# Patient Record
Sex: MALE | Race: WHITE | NOT HISPANIC OR LATINO | URBAN - NONMETROPOLITAN AREA
[De-identification: names, ages, dates, MRNs, and addresses within clinical notes are randomized per-mention and may not be internally consistent; named-entity substitution may affect disease eponyms.]

---

## 2020-02-03 ENCOUNTER — OFFICE VISIT (OUTPATIENT)
Dept: FAMILY MEDICINE CLINIC | Facility: CLINIC | Age: 25
End: 2020-02-03

## 2020-02-03 VITALS
OXYGEN SATURATION: 96 % | BODY MASS INDEX: 38.8 KG/M2 | DIASTOLIC BLOOD PRESSURE: 79 MMHG | HEART RATE: 98 BPM | WEIGHT: 256 LBS | TEMPERATURE: 100.8 F | HEIGHT: 68 IN | SYSTOLIC BLOOD PRESSURE: 125 MMHG

## 2020-02-03 DIAGNOSIS — Z02.1 PRE-EMPLOYMENT EXAMINATION: ICD-10-CM

## 2020-02-03 DIAGNOSIS — Z02.1 PRE-EMPLOYMENT DRUG SCREENING: Primary | ICD-10-CM

## 2020-02-03 LAB
BILIRUB BLD-MCNC: ABNORMAL MG/DL
CLARITY, POC: CLEAR
COLOR UR: ABNORMAL
GLUCOSE UR STRIP-MCNC: NEGATIVE MG/DL
KETONES UR QL: ABNORMAL
LEUKOCYTE EST, POC: NEGATIVE
NITRITE UR-MCNC: NEGATIVE MG/ML
PH UR: 5.5 [PH] (ref 5–8)
POC AMPHETAMINES: NEGATIVE
POC BARBITURATES: NEGATIVE
POC BENZODIAZEPHINES: NEGATIVE
POC COCAINE: NEGATIVE
POC METHADONE: NEGATIVE
POC METHAMPHETAMINE SCREEN URINE: NEGATIVE
POC OPIATES: NEGATIVE
POC OXYCODONE: NEGATIVE
POC PHENCYCLIDINE: NEGATIVE
POC PROPOXYPHENE: NEGATIVE
POC THC: NEGATIVE
POC TRICYCLIC ANTIDEPRESSANTS: NEGATIVE
PROT UR STRIP-MCNC: NEGATIVE MG/DL
RBC # UR STRIP: NEGATIVE /UL
SP GR UR: 1.03 (ref 1–1.03)
UROBILINOGEN UR QL: NORMAL

## 2020-02-03 PROCEDURE — 81003 URINALYSIS AUTO W/O SCOPE: CPT | Performed by: NURSE PRACTITIONER

## 2020-02-03 PROCEDURE — 80305 DRUG TEST PRSMV DIR OPT OBS: CPT | Performed by: NURSE PRACTITIONER

## 2020-02-03 PROCEDURE — PEPHY: Performed by: NURSE PRACTITIONER

## 2020-02-03 RX ORDER — IBUPROFEN 200 MG
TABLET ORAL
COMMUNITY
Start: 2013-03-13

## 2022-09-23 ENCOUNTER — HOSPITAL ENCOUNTER (EMERGENCY)
Facility: HOSPITAL | Age: 27
Discharge: HOME | End: 2022-09-23
Attending: EMERGENCY MEDICINE | Admitting: EMERGENCY MEDICINE

## 2022-09-23 ENCOUNTER — APPOINTMENT (OUTPATIENT)
Dept: CT IMAGING | Facility: HOSPITAL | Age: 27
End: 2022-09-23

## 2022-09-23 VITALS
TEMPERATURE: 98.2 F | OXYGEN SATURATION: 98 % | HEART RATE: 76 BPM | BODY MASS INDEX: 41.97 KG/M2 | RESPIRATION RATE: 18 BRPM | SYSTOLIC BLOOD PRESSURE: 130 MMHG | DIASTOLIC BLOOD PRESSURE: 82 MMHG | WEIGHT: 276.9 LBS | HEIGHT: 68 IN

## 2022-09-23 DIAGNOSIS — R10.9 FLANK PAIN: Primary | ICD-10-CM

## 2022-09-23 LAB
ALBUMIN SERPL-MCNC: 4.1 G/DL (ref 3.5–5.2)
ALBUMIN/GLOB SERPL: 1.8 G/DL
ALP SERPL-CCNC: 91 U/L (ref 39–117)
ALT SERPL W P-5'-P-CCNC: 24 U/L (ref 1–41)
ANION GAP SERPL CALCULATED.3IONS-SCNC: 7.1 MMOL/L (ref 5–15)
AST SERPL-CCNC: 20 U/L (ref 1–40)
BASOPHILS # BLD AUTO: 0.08 10*3/MM3 (ref 0–0.2)
BASOPHILS NFR BLD AUTO: 0.6 % (ref 0–1.5)
BILIRUB SERPL-MCNC: 0.3 MG/DL (ref 0–1.2)
BILIRUB UR QL STRIP: NEGATIVE
BUN SERPL-MCNC: 13 MG/DL (ref 6–20)
BUN/CREAT SERPL: 14.1 (ref 7–25)
CALCIUM SPEC-SCNC: 9.4 MG/DL (ref 8.6–10.5)
CHLORIDE SERPL-SCNC: 105 MMOL/L (ref 98–107)
CLARITY UR: CLEAR
CO2 SERPL-SCNC: 26.9 MMOL/L (ref 22–29)
COLOR UR: YELLOW
CREAT SERPL-MCNC: 0.92 MG/DL (ref 0.76–1.27)
DEPRECATED RDW RBC AUTO: 37.6 FL (ref 37–54)
EGFRCR SERPLBLD CKD-EPI 2021: 118.4 ML/MIN/1.73
EOSINOPHIL # BLD AUTO: 0.48 10*3/MM3 (ref 0–0.4)
EOSINOPHIL NFR BLD AUTO: 3.6 % (ref 0.3–6.2)
ERYTHROCYTE [DISTWIDTH] IN BLOOD BY AUTOMATED COUNT: 11.7 % (ref 12.3–15.4)
GLOBULIN UR ELPH-MCNC: 2.3 GM/DL
GLUCOSE SERPL-MCNC: 94 MG/DL (ref 65–99)
GLUCOSE UR STRIP-MCNC: NEGATIVE MG/DL
HCT VFR BLD AUTO: 44.8 % (ref 37.5–51)
HGB BLD-MCNC: 16 G/DL (ref 13–17.7)
HGB UR QL STRIP.AUTO: NEGATIVE
IMM GRANULOCYTES # BLD AUTO: 0.06 10*3/MM3 (ref 0–0.05)
IMM GRANULOCYTES NFR BLD AUTO: 0.4 % (ref 0–0.5)
KETONES UR QL STRIP: NEGATIVE
LEUKOCYTE ESTERASE UR QL STRIP.AUTO: NEGATIVE
LYMPHOCYTES # BLD AUTO: 5.28 10*3/MM3 (ref 0.7–3.1)
LYMPHOCYTES NFR BLD AUTO: 39.2 % (ref 19.6–45.3)
MCH RBC QN AUTO: 31.3 PG (ref 26.6–33)
MCHC RBC AUTO-ENTMCNC: 35.7 G/DL (ref 31.5–35.7)
MCV RBC AUTO: 87.7 FL (ref 79–97)
MONOCYTES # BLD AUTO: 0.91 10*3/MM3 (ref 0.1–0.9)
MONOCYTES NFR BLD AUTO: 6.8 % (ref 5–12)
NEUTROPHILS NFR BLD AUTO: 49.4 % (ref 42.7–76)
NEUTROPHILS NFR BLD AUTO: 6.65 10*3/MM3 (ref 1.7–7)
NITRITE UR QL STRIP: NEGATIVE
NRBC BLD AUTO-RTO: 0 /100 WBC (ref 0–0.2)
PH UR STRIP.AUTO: 5.5 [PH] (ref 4.5–8)
PLATELET # BLD AUTO: 208 10*3/MM3 (ref 140–450)
PMV BLD AUTO: 10.6 FL (ref 6–12)
POTASSIUM SERPL-SCNC: 3.9 MMOL/L (ref 3.5–5.2)
PROT SERPL-MCNC: 6.4 G/DL (ref 6–8.5)
PROT UR QL STRIP: NEGATIVE
RBC # BLD AUTO: 5.11 10*6/MM3 (ref 4.14–5.8)
SODIUM SERPL-SCNC: 139 MMOL/L (ref 136–145)
SP GR UR STRIP: 1.02 (ref 1–1.03)
UROBILINOGEN UR QL STRIP: NORMAL
WBC NRBC COR # BLD: 13.46 10*3/MM3 (ref 3.4–10.8)

## 2022-09-23 PROCEDURE — 80053 COMPREHEN METABOLIC PANEL: CPT | Performed by: EMERGENCY MEDICINE

## 2022-09-23 PROCEDURE — 96374 THER/PROPH/DIAG INJ IV PUSH: CPT

## 2022-09-23 PROCEDURE — 74176 CT ABD & PELVIS W/O CONTRAST: CPT

## 2022-09-23 PROCEDURE — 25010000002 KETOROLAC TROMETHAMINE PER 15 MG

## 2022-09-23 PROCEDURE — 81003 URINALYSIS AUTO W/O SCOPE: CPT | Performed by: EMERGENCY MEDICINE

## 2022-09-23 PROCEDURE — 99283 EMERGENCY DEPT VISIT LOW MDM: CPT

## 2022-09-23 PROCEDURE — 85025 COMPLETE CBC W/AUTO DIFF WBC: CPT | Performed by: EMERGENCY MEDICINE

## 2022-09-23 RX ORDER — KETOROLAC TROMETHAMINE 30 MG/ML
15 INJECTION, SOLUTION INTRAMUSCULAR; INTRAVENOUS ONCE
Status: COMPLETED | OUTPATIENT
Start: 2022-09-23 | End: 2022-09-23

## 2022-09-23 RX ORDER — SODIUM CHLORIDE 0.9 % (FLUSH) 0.9 %
10 SYRINGE (ML) INJECTION AS NEEDED
Status: DISCONTINUED | OUTPATIENT
Start: 2022-09-23 | End: 2022-09-23 | Stop reason: HOSPADM

## 2022-09-23 RX ADMIN — KETOROLAC TROMETHAMINE 15 MG: 30 INJECTION, SOLUTION INTRAMUSCULAR; INTRAVENOUS at 20:48

## 2022-09-23 RX ADMIN — SODIUM CHLORIDE 1000 ML: 9 INJECTION, SOLUTION INTRAVENOUS at 20:48

## 2022-09-23 NOTE — ED PROVIDER NOTES
EMERGENCY DEPARTMENT ENCOUNTER      Room Number: 05/05    History is provided by the patient, no translation services needed    HPI:    Chief complaint: Flank pain    Location: Right flank    Quality/Severity: Patient describes the pain as a sharp pain.  He rates the severity of 5 out of 10.    Timing/Duration: Since this morning.    Modifying Factors: None    Associated Symptoms: Hematuria, right lower abdominal pain    Narrative: Pt is a 27 y.o. male who presents complaining of right flank pain since this morning.  Patient advises that he has a history of kidney stones.  He woke up this morning with back pain that he believed may have been muscular in nature.  Once the patient went to work the back pain continued on the right side.  The pain wraps around to his right lower abdomen, mimicking the pain from his previous kidney stones.  The patient left work to go to the hospital and was seen at [facility].  A UA was performed and the patient was diagnosed with sciatica.  Patient advises that no blood work or scans were performed.  He states that he went home and then noticed that he had red/brown urine.  He denies any nausea or vomiting.  Patient denies any fevers or chills.  He denies any dysuria or difficulty urinating.      PMD: Provider, No Known    REVIEW OF SYSTEMS  Review of Systems   Constitutional: Negative for chills and fever.   Eyes: Negative for visual disturbance.   Respiratory: Negative for cough and shortness of breath.    Cardiovascular: Negative for chest pain.   Gastrointestinal: Positive for abdominal pain (Right flank pain wrapping around to RLQ ). Negative for blood in stool, constipation, diarrhea, nausea and vomiting.   Genitourinary: Positive for flank pain (Right) and hematuria. Negative for difficulty urinating and dysuria.   Musculoskeletal: Negative for gait problem, myalgias and neck pain.   Skin: Negative for color change, pallor, rash and wound.   Neurological: Negative for  dizziness, syncope, weakness, numbness and headaches.   Psychiatric/Behavioral: Negative for confusion. The patient is not nervous/anxious.          PAST MEDICAL HISTORY  Active Ambulatory Problems     Diagnosis Date Noted   • No Active Ambulatory Problems     Resolved Ambulatory Problems     Diagnosis Date Noted   • No Resolved Ambulatory Problems     Past Medical History:   Diagnosis Date   • Bronchitis, acute    • Cough    • Fever, unspecified    • Fracture, finger, distal phalanx    • Gastroenteritis    • Migraines    • Seizure disorder (HCC)    • Sinusitis    • Testicular pain    • URI (upper respiratory infection)    • Viral infection        PAST SURGICAL HISTORY  Past Surgical History:   Procedure Laterality Date   • ANKLE SURGERY     • TONSILLECTOMY AND ADENOIDECTOMY         FAMILY HISTORY  Family History   Adopted: Yes       SOCIAL HISTORY  Social History     Socioeconomic History   • Marital status: Single   Tobacco Use   • Smoking status: Current Every Day Smoker     Packs/day: 1.00   • Smokeless tobacco: Never Used   Substance and Sexual Activity   • Alcohol use: Not Currently   • Drug use: Not Currently       ALLERGIES  Patient has no known allergies.      Current Facility-Administered Medications:   •  sodium chloride 0.9 % bolus 1,000 mL, 1,000 mL, Intravenous, Once, Fatimah Humphries PA-C, Last Rate: 2,000 mL/hr at 09/23/22 2048, 1,000 mL at 09/23/22 2048  •  [COMPLETED] Insert peripheral IV, , , Once **AND** sodium chloride 0.9 % flush 10 mL, 10 mL, Intravenous, PRN, Alfred Pulido MD    Current Outpatient Medications:   •  ibuprofen (ADVIL,MOTRIN) 200 MG tablet, ADVIL 200 MG TABS, Disp: , Rfl:     PHYSICAL EXAM  ED Triage Vitals [09/23/22 1913]   Temp Heart Rate Resp BP SpO2   97.9 °F (36.6 °C) 88 24 140/99 96 %      Temp src Heart Rate Source Patient Position BP Location FiO2 (%)   Oral Monitor Sitting Right arm --       Physical Exam  Vitals and nursing note reviewed.   Constitutional:        General: He is not in acute distress.     Appearance: Normal appearance. He is not ill-appearing, toxic-appearing or diaphoretic.   HENT:      Head: Normocephalic and atraumatic.   Eyes:      Extraocular Movements: Extraocular movements intact.      Conjunctiva/sclera: Conjunctivae normal.      Pupils: Pupils are equal, round, and reactive to light.   Cardiovascular:      Rate and Rhythm: Normal rate and regular rhythm.      Heart sounds: Normal heart sounds.   Pulmonary:      Effort: Pulmonary effort is normal. No respiratory distress.      Breath sounds: Normal breath sounds. No stridor. No wheezing, rhonchi or rales.   Chest:      Chest wall: No tenderness.   Abdominal:      General: Bowel sounds are normal. There is no distension.      Palpations: Abdomen is soft. There is no mass.      Tenderness: There is no abdominal tenderness. There is right CVA tenderness. There is no left CVA tenderness, guarding or rebound.   Musculoskeletal:         General: No swelling, tenderness, deformity or signs of injury. Normal range of motion.      Cervical back: Normal range of motion and neck supple.      Right lower leg: No edema.      Left lower leg: No edema.   Skin:     General: Skin is warm and dry.      Coloration: Skin is not jaundiced or pale.      Findings: No bruising, erythema, lesion or rash.   Neurological:      Mental Status: He is alert and oriented to person, place, and time.   Psychiatric:         Mood and Affect: Mood and affect normal.           LAB RESULTS  Lab Results (last 24 hours)     Procedure Component Value Units Date/Time    Urinalysis With Microscopic If Indicated (No Culture) - Urine, Clean Catch [[id number]]  (Normal) Collected: 09/23/22 1917    Specimen: Urine, Clean Catch Updated: 09/23/22 1927     Color, UA Yellow     Appearance, UA Clear     pH, UA 5.5     Specific Gravity, UA 1.023     Comment: Result obtained by Refractometer        Glucose, UA Negative     Ketones, UA Negative      Bilirubin, UA Negative     Blood, UA Negative     Protein, UA Negative     Leuk Esterase, UA Negative     Nitrite, UA Negative     Urobilinogen, UA 0.2 E.U./dL    Narrative:      Urine microscopic not indicated.    CBC & Differential [[id number]]  (Abnormal) Collected: 09/23/22 1923    Specimen: Blood Updated: 09/23/22 1939    Narrative:      The following orders were created for panel order CBC & Differential.  Procedure                               Abnormality         Status                     ---------                               -----------         ------                     CBC Auto Differential[[id number]]        Abnormal            Final result                 Please view results for these tests on the individual orders.    Comprehensive Metabolic Panel [[id number]] Collected: 09/23/22 1923    Specimen: Blood Updated: 09/23/22 1955     Glucose 94 mg/dL      BUN 13 mg/dL      Creatinine 0.92 mg/dL      Sodium 139 mmol/L      Potassium 3.9 mmol/L      Chloride 105 mmol/L      CO2 26.9 mmol/L      Calcium 9.4 mg/dL      Total Protein 6.4 g/dL      Albumin 4.10 g/dL      ALT (SGPT) 24 U/L      AST (SGOT) 20 U/L      Alkaline Phosphatase 91 U/L      Total Bilirubin 0.3 mg/dL      Globulin 2.3 gm/dL      A/G Ratio 1.8 g/dL      BUN/Creatinine Ratio 14.1     Anion Gap 7.1 mmol/L      eGFR 118.4 mL/min/1.73      Comment: National Kidney Foundation and American Society of Nephrology (ASN) Task Force recommended calculation based on the Chronic Kidney Disease Epidemiology Collaboration (CKD-EPI) equation refit without adjustment for race.       Narrative:      GFR Normal >60  Chronic Kidney Disease <60  Kidney Failure <15      CBC Auto Differential [[id number]]  (Abnormal) Collected: 09/23/22 1923    Specimen: Blood Updated: 09/23/22 1939     WBC 13.46 10*3/mm3      RBC 5.11 10*6/mm3      Hemoglobin 16.0 g/dL      Hematocrit 44.8 %      MCV 87.7 fL      MCH 31.3 pg      MCHC 35.7 g/dL      RDW 11.7 %      RDW-SD 37.6  fl      MPV 10.6 fL      Platelets 208 10*3/mm3      Neutrophil % 49.4 %      Lymphocyte % 39.2 %      Monocyte % 6.8 %      Eosinophil % 3.6 %      Basophil % 0.6 %      Immature Grans % 0.4 %      Neutrophils, Absolute 6.65 10*3/mm3      Lymphocytes, Absolute 5.28 10*3/mm3      Monocytes, Absolute 0.91 10*3/mm3      Eosinophils, Absolute 0.48 10*3/mm3      Basophils, Absolute 0.08 10*3/mm3      Immature Grans, Absolute 0.06 10*3/mm3      nRBC 0.0 /100 WBC             I ordered the above labs and reviewed the results    RADIOLOGY  CT Abdomen Pelvis Without Contrast    Result Date: 9/23/2022  CT Abdomen Pelvis WO INDICATION: Right-sided pain starting around 2:00 AM today,  hematuria with history of kidney stones TECHNIQUE: CT of the abdomen and pelvis without IV contrast. Coronal and sagittal reconstructions were obtained.  Radiation dose reduction techniques included automated exposure control or exposure modulation based on body size. Radiation audit for CT and nuclear cardiology exams in the last 12 months: 0. COMPARISON: 10/11/2019 FINDINGS: The visualized lung bases are clear. No destructive osseous lesion. Evaluation of the solid viscera is compromised by lack of IV contrast. Allowing for this: Normal noncontrast appearance of the liver, gallbladder, pancreas, spleen and both adrenal glands. The kidneys are symmetric in size. Evaluation for solid renal lesion is largely precluded by lack of IV contrast. . There are a few renal calculi measuring less than 2 mm on both sides but there is no ureteral calculus and there is no hydronephrosis. Nonspecific urinary bladder wall thickening. Correlate with urinalysis. No evidence of bowel obstruction. No localizing perienteric inflammatory change. Normal appendix. Normal caliber of the abdominal aorta. No lymphadenopathy within the abdomen or pelvis by size criteria.     1.  There are a few renal calculi measuring less than 2 mm on both sides but there is no ureteral  "calculus and there is no hydronephrosis. 2.  Nonspecific urinary bladder wall thickening. Correlate with urinalysis. Signer Name: BALTA WARREN MD  Signed: 9/23/2022 8:13 PM  Workstation Name: [email or web address]  [facility]      I ordered the above radiologic testing and reviewed the results    PROCEDURES  Procedures      PROGRESS AND CONSULTS  ED Course as of 09/23/22 2116   Fri Sep 23, 2022   1954 Turning the pt over to Dr. Pulido at this time.  []   2116 I reviewed the labs and the CT scan from today's visit.  All test results look to be pretty reassuring to me here.  I do not see any ureteral stone and his urinalysis looks pretty reassuring.  No need for antibiotics or further intervention at this time.  We will advised rest and plenty of fluids orally.  Encourage typical over-the-counter medications at home for pain as need be.  Reviewed with him the usual \"return to ER\" instructions for any worsening signs or symptoms as well. [MALOU]      ED Course User Index  [AH] Fatimah Humphries PA-C  [MALOU] Alfred Pulido MD           MEDICAL DECISION MAKING    MDM     My differential diagnosis for abdominal pain includes but is not limited to:  Gastritis, gastroenteritis, peptic ulcer disease, GERD, esophageal perforation, acute appendicitis, mesenteric adenitis, Meckel’s diverticulum, epiploic appendagitis, diverticulitis, colon cancer, ulcerative colitis, Crohn’s disease, intussusception, small bowel obstruction, adhesions, ischemic bowel, perforated viscus, ileus, obstipation, biliary colic, cholecystitis, cholelithiasis, Pastor-Liam Thomas, hepatitis, pancreatitis, common bile duct obstruction, cholangitis, bile leak, splenic trauma, splenic rupture, splenic infarction, splenic abscess, abdominal abscess, ascites, spontaneous bacterial peritonitis, hernia, UTI, cystitis, prostatitis, ureterolithiasis, urinary obstruction, AAA, myocardial infarction, pneumonia, cancer, porphyria, DKA, medications, sickle " cell, viral syndrome, zoster  DIAGNOSIS  Final diagnoses:   Flank pain       Latest Documented Vital Signs:  As of 21:16 EDT  BP- 122/76 HR- 72 Temp- 97.9 °F (36.6 °C) (Oral) O2 sat- 97%    DISPOSITION  home    Discussed pertinent findings with the patient/family.  Patient/Family voiced understanding of need to follow-up for recheck and further testing as needed.  Return to the Emergency Department warnings were given.         Medication List      No changes were made to your prescriptions during this visit.              Follow-up Information     Schedule an appointment as soon as possible for a visit  with PCP office.    Why: As needed                         Dictated utilizing Dragon dictation     Alfred Pulido MD  09/23/22 0851

## 2022-09-24 NOTE — DISCHARGE INSTRUCTIONS
Please return to the emergency room for any worsening pain, fevers, nausea, vomiting, difficulties urinating or any other concerns

## 2023-10-08 ENCOUNTER — HOSPITAL ENCOUNTER (EMERGENCY)
Facility: HOSPITAL | Age: 28
Discharge: HOME | End: 2023-10-08
Attending: EMERGENCY MEDICINE | Admitting: EMERGENCY MEDICINE

## 2023-10-08 ENCOUNTER — APPOINTMENT (OUTPATIENT)
Dept: CT IMAGING | Facility: HOSPITAL | Age: 28
End: 2023-10-08

## 2023-10-08 VITALS
HEART RATE: 73 BPM | OXYGEN SATURATION: 96 % | WEIGHT: 280 LBS | HEIGHT: 68 IN | TEMPERATURE: 97.5 F | DIASTOLIC BLOOD PRESSURE: 100 MMHG | RESPIRATION RATE: 16 BRPM | BODY MASS INDEX: 42.44 KG/M2 | SYSTOLIC BLOOD PRESSURE: 151 MMHG

## 2023-10-08 DIAGNOSIS — R79.89 ELEVATED LFTS: ICD-10-CM

## 2023-10-08 DIAGNOSIS — N20.0 KIDNEY STONE ON LEFT SIDE: Primary | ICD-10-CM

## 2023-10-08 LAB
ALBUMIN SERPL-MCNC: 4.1 G/DL (ref 3.5–5.2)
ALBUMIN/GLOB SERPL: 1.4 G/DL
ALP SERPL-CCNC: 96 U/L (ref 39–117)
ALT SERPL W P-5'-P-CCNC: 186 U/L (ref 1–41)
ANION GAP SERPL CALCULATED.3IONS-SCNC: 15.2 MMOL/L (ref 5–15)
AST SERPL-CCNC: 107 U/L (ref 1–40)
BACTERIA UR QL AUTO: ABNORMAL /HPF
BASOPHILS # BLD AUTO: 0.05 10*3/MM3 (ref 0–0.2)
BASOPHILS NFR BLD AUTO: 0.5 % (ref 0–1.5)
BILIRUB SERPL-MCNC: 1 MG/DL (ref 0–1.2)
BILIRUB UR QL STRIP: NEGATIVE
BUN SERPL-MCNC: 10 MG/DL (ref 6–20)
BUN/CREAT SERPL: 13.5 (ref 7–25)
CALCIUM SPEC-SCNC: 8.9 MG/DL (ref 8.6–10.5)
CHLORIDE SERPL-SCNC: 97 MMOL/L (ref 98–107)
CLARITY UR: ABNORMAL
CO2 SERPL-SCNC: 18.8 MMOL/L (ref 22–29)
COLOR UR: ABNORMAL
CREAT SERPL-MCNC: 0.74 MG/DL (ref 0.76–1.27)
DEPRECATED RDW RBC AUTO: 39.2 FL (ref 37–54)
EGFRCR SERPLBLD CKD-EPI 2021: 128.2 ML/MIN/1.73
EOSINOPHIL # BLD AUTO: 0.37 10*3/MM3 (ref 0–0.4)
EOSINOPHIL NFR BLD AUTO: 3.4 % (ref 0.3–6.2)
ERYTHROCYTE [DISTWIDTH] IN BLOOD BY AUTOMATED COUNT: 11.9 % (ref 12.3–15.4)
GLOBULIN UR ELPH-MCNC: 3 GM/DL
GLUCOSE SERPL-MCNC: 117 MG/DL (ref 65–99)
GLUCOSE UR STRIP-MCNC: NEGATIVE MG/DL
HCT VFR BLD AUTO: 48 % (ref 37.5–51)
HGB BLD-MCNC: 17 G/DL (ref 13–17.7)
HGB UR QL STRIP.AUTO: ABNORMAL
HYALINE CASTS UR QL AUTO: ABNORMAL /LPF
IMM GRANULOCYTES # BLD AUTO: 0.03 10*3/MM3 (ref 0–0.05)
IMM GRANULOCYTES NFR BLD AUTO: 0.3 % (ref 0–0.5)
KETONES UR QL STRIP: NEGATIVE
LEUKOCYTE ESTERASE UR QL STRIP.AUTO: NEGATIVE
LYMPHOCYTES # BLD AUTO: 4.47 10*3/MM3 (ref 0.7–3.1)
LYMPHOCYTES NFR BLD AUTO: 40.9 % (ref 19.6–45.3)
MCH RBC QN AUTO: 32.1 PG (ref 26.6–33)
MCHC RBC AUTO-ENTMCNC: 35.4 G/DL (ref 31.5–35.7)
MCV RBC AUTO: 90.6 FL (ref 79–97)
MONOCYTES # BLD AUTO: 0.92 10*3/MM3 (ref 0.1–0.9)
MONOCYTES NFR BLD AUTO: 8.4 % (ref 5–12)
NEUTROPHILS NFR BLD AUTO: 46.5 % (ref 42.7–76)
NEUTROPHILS NFR BLD AUTO: 5.1 10*3/MM3 (ref 1.7–7)
NITRITE UR QL STRIP: NEGATIVE
NRBC BLD AUTO-RTO: 0 /100 WBC (ref 0–0.2)
PH UR STRIP.AUTO: 6 [PH] (ref 4.5–8)
PLATELET # BLD AUTO: 181 10*3/MM3 (ref 140–450)
PMV BLD AUTO: 10.5 FL (ref 6–12)
POTASSIUM SERPL-SCNC: 3.9 MMOL/L (ref 3.5–5.2)
PROT SERPL-MCNC: 7.1 G/DL (ref 6–8.5)
PROT UR QL STRIP: ABNORMAL
RBC # BLD AUTO: 5.3 10*6/MM3 (ref 4.14–5.8)
RBC # UR STRIP: ABNORMAL /HPF
REF LAB TEST METHOD: ABNORMAL
SODIUM SERPL-SCNC: 131 MMOL/L (ref 136–145)
SP GR UR STRIP: 1.03 (ref 1–1.03)
SQUAMOUS #/AREA URNS HPF: ABNORMAL /HPF
UROBILINOGEN UR QL STRIP: ABNORMAL
WBC # UR STRIP: ABNORMAL /HPF
WBC NRBC COR # BLD: 10.94 10*3/MM3 (ref 3.4–10.8)

## 2023-10-08 PROCEDURE — 96375 TX/PRO/DX INJ NEW DRUG ADDON: CPT

## 2023-10-08 PROCEDURE — 80053 COMPREHEN METABOLIC PANEL: CPT

## 2023-10-08 PROCEDURE — 85025 COMPLETE CBC W/AUTO DIFF WBC: CPT

## 2023-10-08 PROCEDURE — 81001 URINALYSIS AUTO W/SCOPE: CPT

## 2023-10-08 PROCEDURE — 25010000002 ONDANSETRON PER 1 MG: Performed by: EMERGENCY MEDICINE

## 2023-10-08 PROCEDURE — 96374 THER/PROPH/DIAG INJ IV PUSH: CPT

## 2023-10-08 PROCEDURE — 25010000002 KETOROLAC TROMETHAMINE PER 15 MG: Performed by: EMERGENCY MEDICINE

## 2023-10-08 PROCEDURE — 99284 EMERGENCY DEPT VISIT MOD MDM: CPT

## 2023-10-08 PROCEDURE — 25810000003 LACTATED RINGERS SOLUTION: Performed by: EMERGENCY MEDICINE

## 2023-10-08 PROCEDURE — 25010000002 HYDROMORPHONE 1 MG/ML SOLUTION: Performed by: EMERGENCY MEDICINE

## 2023-10-08 PROCEDURE — 74176 CT ABD & PELVIS W/O CONTRAST: CPT

## 2023-10-08 PROCEDURE — 25010000002 MORPHINE PER 10 MG: Performed by: EMERGENCY MEDICINE

## 2023-10-08 RX ORDER — KETOROLAC TROMETHAMINE 30 MG/ML
30 INJECTION, SOLUTION INTRAMUSCULAR; INTRAVENOUS ONCE
Status: COMPLETED | OUTPATIENT
Start: 2023-10-08 | End: 2023-10-08

## 2023-10-08 RX ORDER — TAMSULOSIN HYDROCHLORIDE 0.4 MG/1
1 CAPSULE ORAL DAILY
Qty: 14 CAPSULE | Refills: 0 | Status: SHIPPED | OUTPATIENT
Start: 2023-10-08 | End: 2023-10-22

## 2023-10-08 RX ORDER — ONDANSETRON 2 MG/ML
4 INJECTION INTRAMUSCULAR; INTRAVENOUS ONCE
Status: COMPLETED | OUTPATIENT
Start: 2023-10-08 | End: 2023-10-08

## 2023-10-08 RX ORDER — ONDANSETRON 4 MG/1
4 TABLET, ORALLY DISINTEGRATING ORAL EVERY 6 HOURS PRN
Qty: 25 TABLET | Refills: 0 | Status: SHIPPED | OUTPATIENT
Start: 2023-10-08

## 2023-10-08 RX ORDER — SODIUM CHLORIDE 0.9 % (FLUSH) 0.9 %
10 SYRINGE (ML) INJECTION AS NEEDED
Status: DISCONTINUED | OUTPATIENT
Start: 2023-10-08 | End: 2023-10-08 | Stop reason: HOSPADM

## 2023-10-08 RX ORDER — OXYCODONE HYDROCHLORIDE AND ACETAMINOPHEN 5; 325 MG/1; MG/1
1 TABLET ORAL EVERY 6 HOURS PRN
Qty: 25 TABLET | Refills: 0 | Status: SHIPPED | OUTPATIENT
Start: 2023-10-08

## 2023-10-08 RX ADMIN — ONDANSETRON 4 MG: 2 INJECTION INTRAMUSCULAR; INTRAVENOUS at 04:56

## 2023-10-08 RX ADMIN — SODIUM CHLORIDE, POTASSIUM CHLORIDE, SODIUM LACTATE AND CALCIUM CHLORIDE 1000 ML: 600; 310; 30; 20 INJECTION, SOLUTION INTRAVENOUS at 05:23

## 2023-10-08 RX ADMIN — KETOROLAC TROMETHAMINE 30 MG: 30 INJECTION, SOLUTION INTRAMUSCULAR; INTRAVENOUS at 04:56

## 2023-10-08 RX ADMIN — HYDROMORPHONE HYDROCHLORIDE 1 MG: 1 INJECTION, SOLUTION INTRAMUSCULAR; INTRAVENOUS; SUBCUTANEOUS at 05:34

## 2023-10-08 RX ADMIN — MORPHINE SULFATE 4 MG: 4 INJECTION INTRAVENOUS at 04:56

## 2023-10-08 NOTE — DISCHARGE INSTRUCTIONS
Medications as directed.  Your lab work tonight also showed elevated liver function tests.  Recommend you stop drinking alcohol.   If you choose to drink alcohol do not drink on a daily basis.  Follow-up with urology as above.  Follow-up with your PCP and 2 weeks for repeat liver function test.  Return to ED for uncontrolled pain, development of high fever, chills, other signs of systemic infection, medical emergencies.

## 2023-10-08 NOTE — ED PROVIDER NOTES
Subjective   History of Present Illness  Luis Hugo is a 28-year-old white male who present secondary to left-sided flank pain.  Patient was awakened by the pain 3 AM this morning.  Mild nausea but no vomiting.  Symptoms consistent with prior kidney stones.  Patient noted a red tent to his urine shortly after awakening at 3 AM.  No fever or chills.  No diarrhea or constipation.  Patient presents for evaluation.      Patient smokes cigarettes 1 pack/day.  Drinks 4-6 beers daily.    History provided by:  Patient      Review of Systems   Constitutional:  Negative for fever.   HENT:  Negative for rhinorrhea.    Eyes:  Negative for redness.   Respiratory:  Negative for cough.    Cardiovascular:  Negative for chest pain.   Gastrointestinal:  Positive for nausea. Negative for abdominal pain.   Genitourinary:  Positive for flank pain and hematuria. Negative for dysuria.   Musculoskeletal:  Negative for back pain.   Skin:  Negative for rash.   Neurological:  Negative for syncope.   All other systems reviewed and are negative.      Past Medical History:   Diagnosis Date    Bronchitis, acute     Cough     Fever, unspecified     Fracture, finger, distal phalanx     Gastroenteritis     Kidney stones     Migraines     Seizure disorder     Sinusitis     Testicular pain     URI (upper respiratory infection)     Viral infection        No Known Allergies    Past Surgical History:   Procedure Laterality Date    ANKLE SURGERY      TONSILLECTOMY AND ADENOIDECTOMY         Family History   Adopted: Yes       Social History     Socioeconomic History    Marital status: Single   Tobacco Use    Smoking status: Every Day     Packs/day: 1     Types: Cigarettes    Smokeless tobacco: Never   Substance and Sexual Activity    Alcohol use: Not Currently    Drug use: Not Currently           Objective   Physical Exam  Vitals and nursing note reviewed.   Constitutional:       General: He is not in acute distress.     Appearance: Normal  appearance. He is well-developed. He is not ill-appearing, toxic-appearing or diaphoretic.      Comments: 28-year-old white male sitting in bed.  Patient is obese.  He otherwise appears in good health.  Vital signs notable for BP of 157/111.  Otherwise unremarkable.  Patient friendly cooperative.  Spouse is at bedside.   HENT:      Head: Normocephalic and atraumatic.      Right Ear: Tympanic membrane, ear canal and external ear normal.      Left Ear: Tympanic membrane, ear canal and external ear normal.      Nose: Nose normal.      Mouth/Throat:      Mouth: Mucous membranes are moist.      Pharynx: Oropharynx is clear.   Eyes:      Extraocular Movements: Extraocular movements intact.      Conjunctiva/sclera: Conjunctivae normal.      Pupils: Pupils are equal, round, and reactive to light.   Cardiovascular:      Rate and Rhythm: Normal rate and regular rhythm.      Heart sounds: Normal heart sounds. No murmur heard.     No friction rub. No gallop.   Pulmonary:      Effort: Pulmonary effort is normal. No respiratory distress.      Breath sounds: Normal breath sounds. No stridor. No wheezing or rales.   Abdominal:      General: There is no distension.      Palpations: Abdomen is soft. There is no mass.      Tenderness: There is no abdominal tenderness. There is left CVA tenderness. There is no guarding or rebound.      Hernia: No hernia is present.   Musculoskeletal:         General: Normal range of motion.      Cervical back: Normal range of motion and neck supple.   Skin:     General: Skin is warm and dry.      Findings: No erythema or rash.   Neurological:      General: No focal deficit present.      Mental Status: He is alert and oriented to person, place, and time.      Cranial Nerves: No cranial nerve deficit.      Deep Tendon Reflexes: Reflexes are normal and symmetric.   Psychiatric:         Mood and Affect: Mood normal.         Behavior: Behavior normal.         Procedures           ED Course  ED Course as of  10/08/23 0602   [place] Oct 08, 2023   0448 Patient has history of kidney stones.  Treating as such.  Obtaining lab work and noncontrasted CT.  Giving pain and nausea medications. [SS]   0500 Color, UA(!): Red [SS]   0500 Blood, UA(!): Large (3+) [SS]   0500 RBC, UA(!): Too Numerous to Count [SS]   0517 Patient feeling much better.  Responded well to pain and nausea medications.  CBC shows minimal elevation of white blood cell count. [SS]   0518 Sodium(!): 131 [SS]   0519 Chloride(!): 97 [SS]   0519 CO2(!): 18.8 [SS]   0519 ALT (SGPT)(!): 186 [SS]   0519 AST (SGOT)(!): 107 [SS]   0519 CBC notable for mild dehydration with CO2 18.8.  ALT and AST elevated at 186 and 107.  Otherwise unremarkable. [SS]   0525 CT shows a 3 mm stone in the proximal left ureter with associated hydro nephrosis.  Otherwise unremarkable. [SS]   0532 Patient's flank pain is returning.  Giving additional pain medication. [SS]   0558 Patient feeling better.  IV fluids completed.  Blood pressure improved.  Will DC home.    Prescription  1-Percocet  2-Zofran  3-Flomax [SS]      ED Course User Index  [SS] Santosh Davis MD      Labs Reviewed   COMPREHENSIVE METABOLIC PANEL - Abnormal; Notable for the following components:       Result Value    Glucose 117 (*)     Creatinine 0.74 (*)     Sodium 131 (*)     Chloride 97 (*)     CO2 18.8 (*)     ALT (SGPT) 186 (*)     AST (SGOT) 107 (*)     Anion Gap 15.2 (*)     All other components within normal limits    Narrative:     GFR Normal >60  Chronic Kidney Disease <60  Kidney Failure <15     URINALYSIS W/ MICROSCOPIC IF INDICATED (NO CULTURE) - Abnormal; Notable for the following components:    Color, UA Red (*)     Appearance, UA Slightly Cloudy (*)     Blood, UA Large (3+) (*)     Protein, UA 30 mg/dL (1+) (*)     All other components within normal limits   CBC WITH AUTO DIFFERENTIAL - Abnormal; Notable for the following components:    WBC 10.94 (*)     RDW 11.9 (*)     Lymphocytes, Absolute 4.47 (*)      Monocytes, Absolute 0.92 (*)     All other components within normal limits   URINALYSIS, MICROSCOPIC ONLY - Abnormal; Notable for the following components:    RBC, UA Too Numerous to Count (*)     Squamous Epithelial Cells, UA 3-6 (*)     All other components within normal limits   CBC AND DIFFERENTIAL    Narrative:     The following orders were created for panel order CBC & Differential.  Procedure                               Abnormality         Status                     ---------                               -----------         ------                     CBC Auto Differential[[id number]]        Abnormal            Final result                 Please view results for these tests on the individual orders.     CT Abdomen Pelvis Without Contrast    Result Date: 10/8/2023  Narrative: CT abdomen and pelvis without contrast  HISTORY: Left flank pain and hematuria  COMPARISON: 9/23/2022  TECHNIQUE:  CT of Abdomen and Pelvis without contrast performed.  Sagittal and Coronal reconstructions performed. Radiation dose reduction techniques included automated exposure control or exposure modulation based on body size. Radiation audit for CT and nuclear cardiology exams in the last 12 months: 0.  FINDINGS:    Clear lung bases. No destructive bony lesion.  Evaluation of the solid viscera is compromised by lack of IV contrast. Allowing for this:  Normal noncontrast appearance of the liver, gallbladder, pancreas, spleen and both adrenal glands.  The kidneys are symmetric in size. Evaluation for solid renal lesion is largely precluded by lack of IV contrast. 3 mm calculus in the proximal left ureter with minimal left hydronephrosis. There is an additional 3 mm left renal calculus. No right renal calculi or ureteral calculus..  No evidence of bowel obstruction. No localizing perienteric inflammatory change.  Normal size abdominal aorta with otherwise limited assessment given lack of IV contrast. No lymphadenopathy in the abdomen  or pelvis. No intraperitoneal free air. No significant free fluid.      Impression: Mild left hydronephrosis with 3 mm proximal left ureteral calculus.   This report was finalized on 10/8/2023 5:21 AM by Thong Baker MD.       My differential diagnosis for abdominal pain includes but is not limited to:  Gastritis, gastroenteritis, peptic ulcer disease, GERD, esophageal perforation, acute appendicitis, mesenteric adenitis, Meckel's diverticulum, epiploic appendagitis, diverticulitis, colon cancer, ulcerative colitis, Crohn's disease, intussusception, small bowel obstruction, adhesions, ischemic bowel, perforated viscus, ileus, obstipation, biliary colic, cholecystitis, cholelithiasis, Pastor-Liam Thomas, hepatitis, pancreatitis, common bile duct obstruction, cholangitis, bile leak, splenic trauma, splenic rupture, splenic infarction, splenic abscess, abdominal wall hematoma, abdominal wall abscess, intra-abdominal abscess, ascites, spontaneous bacterial peritonitis, hernia, UTI, cystitis, prostatitis, ureterolithiasis, urinary obstruction, AAA, myocardial infarction, pneumonia, cancer, porphyria, DKA, medications, sickle cell, viral syndrome, zoster                                       Medical Decision Making  Amount and/or Complexity of Data Reviewed  Radiology: ordered.    Risk  Prescription drug management.        Final diagnoses:   Kidney stone on left side   Elevated LFTs       ED Disposition  ED Disposition       ED Disposition   Discharge    Condition   Stable    Comment   --               Edis Interiano MD  [facility] [address] [address]  [phone]    Schedule an appointment as soon as possible for a visit in 1 week  for continued or worsened symptoms, Sooner if needed    PATIENT CONNECTION - [facility] [address]  [phone]  Call   Monday morning to arrange follow-up in 2 weeks for repeat liver function test.         Medication List        New Prescriptions       ondansetron ODT 4 MG disintegrating tablet  Commonly known as: ZOFRAN-ODT  Place 1 tablet on the tongue Every 6 (Six) Hours As Needed for Nausea or Vomiting for up to 25 doses.     oxyCODONE-acetaminophen 5-325 MG per tablet  Commonly known as: PERCOCET  Take 1 tablet by mouth Every 6 (Six) Hours As Needed for Moderate Pain or Severe Pain for up to 25 doses. 2 tabs if needed     tamsulosin 0.4 MG capsule 24 hr capsule  Commonly known as: FLOMAX  Take 1 capsule by mouth Daily for 14 days. Or until stone has passed               Where to Get Your Medications        These medications were sent to [facility]/pharmacy #[id number] - [place], [place] - [address] [place] [address] - [phone]  - [phone] [address] [place] [address]      Hours: 24-hours Phone: [phone]   ondansetron ODT 4 MG disintegrating tablet  oxyCODONE-acetaminophen 5-325 MG per tablet  tamsulosin 0.4 MG capsule 24 hr capsule            Santosh Davis MD  10/08/23 0603

## 2024-01-12 ENCOUNTER — HOSPITAL ENCOUNTER (EMERGENCY)
Facility: HOSPITAL | Age: 29
Discharge: HOME | End: 2024-01-12
Attending: EMERGENCY MEDICINE
Payer: OTHER MISCELLANEOUS

## 2024-01-12 VITALS
HEIGHT: 68 IN | OXYGEN SATURATION: 97 % | RESPIRATION RATE: 16 BRPM | BODY MASS INDEX: 42.3 KG/M2 | DIASTOLIC BLOOD PRESSURE: 97 MMHG | SYSTOLIC BLOOD PRESSURE: 151 MMHG | HEART RATE: 75 BPM | TEMPERATURE: 98.4 F | WEIGHT: 279.1 LBS

## 2024-01-12 DIAGNOSIS — S39.012A STRAIN OF LUMBAR REGION, INITIAL ENCOUNTER: Primary | ICD-10-CM

## 2024-01-12 PROCEDURE — 99282 EMERGENCY DEPT VISIT SF MDM: CPT

## 2024-01-12 RX ORDER — CYCLOBENZAPRINE HCL 10 MG
10 TABLET ORAL 3 TIMES DAILY PRN
Qty: 15 TABLET | Refills: 0 | Status: SHIPPED | OUTPATIENT
Start: 2024-01-12 | End: 2024-01-12 | Stop reason: ALTCHOICE

## 2024-01-12 NOTE — Clinical Note
[facility] EMERGENCY DEPARTMENT  [address] [place] [address]  Phone: [phone]    Luis Hugo was seen and treated in our emergency department on 1/12/2024.  He may return to work on 01/15/2024.         Thank you for choosing [facility].    Babak Gamboa MD

## 2024-01-13 NOTE — ED PROVIDER NOTES
Subjective   History of Present Illness  27 yo male with right lower back pain after swinging a mallet at work 2 days ago, nonradiating, no associated numbness or weakness or incontinence      Review of Systems   Musculoskeletal:  Positive for back pain.       Past Medical History:   Diagnosis Date    Bronchitis, acute     Cough     Fever, unspecified     Fracture, finger, distal phalanx     Gastroenteritis     Kidney stones     Migraines     Seizure disorder     Sinusitis     Testicular pain     URI (upper respiratory infection)     Viral infection        No Known Allergies    Past Surgical History:   Procedure Laterality Date    ANKLE SURGERY      TONSILLECTOMY AND ADENOIDECTOMY         Family History   Adopted: Yes       Social History     Socioeconomic History    Marital status: Single   Tobacco Use    Smoking status: Every Day     Packs/day: 1     Types: Cigarettes    Smokeless tobacco: Never   Substance and Sexual Activity    Alcohol use: Not Currently    Drug use: Not Currently           Objective   Physical Exam  Constitutional:       Appearance: Normal appearance.   HENT:      Head: Normocephalic and atraumatic.   Cardiovascular:      Pulses: Normal pulses.   Musculoskeletal:      Comments: No point ttp or lumbar spine, pain with ROM with lower back   Skin:     General: Skin is warm and dry.      Capillary Refill: Capillary refill takes less than 2 seconds.   Neurological:      Mental Status: He is alert and oriented to person, place, and time.      Sensory: No sensory deficit.      Motor: No weakness.         Procedures           ED Course                                             Medical Decision Making  Patient clinically with lumbar strain with job with physical demands, will rest this weekend and follow up with workmans comp doctor on Monday.    Problems Addressed:  Strain of lumbar region, initial encounter: complicated acute illness or injury    Risk  Prescription drug management.        Final  diagnoses:   Strain of lumbar region, initial encounter       ED Disposition  ED Disposition       ED Disposition   Discharge    Condition   Stable    Comment   --                 Follow up with your Workmans Comp Provider             Medication List        New Prescriptions      cyclobenzaprine 10 MG tablet  Commonly known as: FLEXERIL  Take 1 tablet by mouth 3 (Three) Times a Day As Needed for Muscle Spasms for up to 15 doses.               Where to Get Your Medications        These medications were sent to [facility]/pharmacy #[id number] - [place], [place] - [address] [place] [address] - [phone]  - [phone] [address] [place] [address]      Hours: 24-hours Phone: [phone]   cyclobenzaprine 10 MG tablet            Babak Gamboa MD  01/12/24 0329

## 2024-04-02 ENCOUNTER — APPOINTMENT (OUTPATIENT)
Dept: CT IMAGING | Facility: HOSPITAL | Age: 29
End: 2024-04-02

## 2024-04-02 ENCOUNTER — HOSPITAL ENCOUNTER (EMERGENCY)
Facility: HOSPITAL | Age: 29
Discharge: HOME | End: 2024-04-02
Attending: EMERGENCY MEDICINE | Admitting: EMERGENCY MEDICINE

## 2024-04-02 VITALS
SYSTOLIC BLOOD PRESSURE: 127 MMHG | DIASTOLIC BLOOD PRESSURE: 74 MMHG | WEIGHT: 250 LBS | HEIGHT: 68 IN | TEMPERATURE: 98.1 F | OXYGEN SATURATION: 98 % | RESPIRATION RATE: 17 BRPM | BODY MASS INDEX: 37.89 KG/M2 | HEART RATE: 80 BPM

## 2024-04-02 DIAGNOSIS — S13.9XXA NECK SPRAIN, INITIAL ENCOUNTER: ICD-10-CM

## 2024-04-02 DIAGNOSIS — F10.929 ALCOHOLIC INTOXICATION WITH COMPLICATION: ICD-10-CM

## 2024-04-02 DIAGNOSIS — S09.90XA INJURY OF HEAD, INITIAL ENCOUNTER: Primary | ICD-10-CM

## 2024-04-02 LAB
ALBUMIN SERPL-MCNC: 4.4 G/DL (ref 3.5–5.2)
ALBUMIN/GLOB SERPL: 1.8 G/DL
ALP SERPL-CCNC: 92 U/L (ref 39–117)
ALT SERPL W P-5'-P-CCNC: 211 U/L (ref 1–41)
ANION GAP SERPL CALCULATED.3IONS-SCNC: 13 MMOL/L (ref 5–15)
AST SERPL-CCNC: 173 U/L (ref 1–40)
BILIRUB SERPL-MCNC: 0.3 MG/DL (ref 0–1.2)
BUN SERPL-MCNC: 9 MG/DL (ref 6–20)
BUN/CREAT SERPL: 12.9 (ref 7–25)
CALCIUM SPEC-SCNC: 9 MG/DL (ref 8.6–10.5)
CHLORIDE SERPL-SCNC: 103 MMOL/L (ref 98–107)
CO2 SERPL-SCNC: 23 MMOL/L (ref 22–29)
CREAT SERPL-MCNC: 0.7 MG/DL (ref 0.76–1.27)
EGFRCR SERPLBLD CKD-EPI 2021: 130.3 ML/MIN/1.73
ETHANOL UR QL: 0.25 %
GLOBULIN UR ELPH-MCNC: 2.4 GM/DL
GLUCOSE SERPL-MCNC: 108 MG/DL (ref 65–99)
MAGNESIUM SERPL-MCNC: 2.4 MG/DL (ref 1.6–2.6)
POTASSIUM SERPL-SCNC: 4.2 MMOL/L (ref 3.5–5.2)
PROT SERPL-MCNC: 6.8 G/DL (ref 6–8.5)
SODIUM SERPL-SCNC: 139 MMOL/L (ref 136–145)

## 2024-04-02 PROCEDURE — 99284 EMERGENCY DEPT VISIT MOD MDM: CPT

## 2024-04-02 PROCEDURE — 25010000002 THIAMINE PER 100 MG: Performed by: EMERGENCY MEDICINE

## 2024-04-02 PROCEDURE — 25810000003 SODIUM CHLORIDE 0.9 % SOLUTION 1,000 ML FLEX CONT: Performed by: EMERGENCY MEDICINE

## 2024-04-02 PROCEDURE — 82077 ASSAY SPEC XCP UR&BREATH IA: CPT | Performed by: EMERGENCY MEDICINE

## 2024-04-02 PROCEDURE — 83735 ASSAY OF MAGNESIUM: CPT | Performed by: EMERGENCY MEDICINE

## 2024-04-02 PROCEDURE — 80053 COMPREHEN METABOLIC PANEL: CPT | Performed by: EMERGENCY MEDICINE

## 2024-04-02 PROCEDURE — 70450 CT HEAD/BRAIN W/O DYE: CPT

## 2024-04-02 PROCEDURE — 72125 CT NECK SPINE W/O DYE: CPT

## 2024-04-02 PROCEDURE — 96365 THER/PROPH/DIAG IV INF INIT: CPT

## 2024-04-02 RX ADMIN — THIAMINE HYDROCHLORIDE 1000 ML/HR: 100 INJECTION, SOLUTION INTRAMUSCULAR; INTRAVENOUS at 06:28

## 2024-04-02 NOTE — ED PROVIDER NOTES
Subjective   History of Present Illness  28-year-old male who admits to heavy EtOH this evening transferred from home status post fall injuring his head.  Patient also punched a wall in frustration but states his hand is not hurting but does complain of moderate headache and neck pain      Review of Systems   Musculoskeletal:  Positive for neck pain.   Neurological:  Positive for headaches.       Past Medical History:   Diagnosis Date    Bronchitis, acute     Cough     Fever, unspecified     Fracture, finger, distal phalanx     Gastroenteritis     Kidney stones     Migraines     Seizure disorder     Sinusitis     Testicular pain     URI (upper respiratory infection)     Viral infection        No Known Allergies    Past Surgical History:   Procedure Laterality Date    ANKLE SURGERY      TONSILLECTOMY AND ADENOIDECTOMY         Family History   Adopted: Yes       Social History     Socioeconomic History    Marital status: Single   Tobacco Use    Smoking status: Every Day     Current packs/day: 1.00     Types: Cigarettes    Smokeless tobacco: Never   Substance and Sexual Activity    Alcohol use: Not Currently    Drug use: Not Currently           Objective   Physical Exam  Constitutional:       Comments: Intoxicated 28-year-old male in no acute distress, airway intact   HENT:      Head:      Comments: No scalp or facial bone injury appreciated     Mouth/Throat:      Mouth: Mucous membranes are moist.      Pharynx: Oropharynx is clear.   Eyes:      Conjunctiva/sclera: Conjunctivae normal.      Pupils: Pupils are equal, round, and reactive to light.   Neck:      Comments: Neck nontender, mild pain with full range of motion  Cardiovascular:      Rate and Rhythm: Normal rate and regular rhythm.      Heart sounds: Normal heart sounds.   Pulmonary:      Effort: Pulmonary effort is normal.      Breath sounds: Normal breath sounds.   Musculoskeletal:         General: No swelling or tenderness. Normal range of motion.       Comments: Right hand without any swelling or tenderness, normal    Skin:     General: Skin is warm and dry.      Capillary Refill: Capillary refill takes less than 2 seconds.   Neurological:      General: No focal deficit present.      Mental Status: He is oriented to person, place, and time.   Psychiatric:      Comments: Intoxicated, otherwise appropriate, no SI         Procedures           ED Course                                             Medical Decision Making  Patient to go home with wife, made aware of elevated liver enzymes and the need for recheck per PCP, will refer as well as treatment for alcoholism, patient desires this to be on an outpatient basis.    Problems Addressed:  Alcoholic intoxication with complication: complicated acute illness or injury  Injury of head, initial encounter: complicated acute illness or injury  Neck sprain, initial encounter: complicated acute illness or injury    Amount and/or Complexity of Data Reviewed  Radiology: ordered.        Final diagnoses:   Injury of head, initial encounter   Neck sprain, initial encounter   Alcoholic intoxication with complication       ED Disposition  ED Disposition       ED Disposition   Discharge    Condition   Stable    Comment   --               PATIENT CONNECTION - [facility] [address]  [phone]        [facility]  [address] [address] [phone]        [facility]  [address] [address] [phone]        [facility]  [address] [address] [phone]             Medication List      No changes were made to your prescriptions during this visit.            Babak Gamboa MD  04/02/24 0743

## 2025-01-03 ENCOUNTER — HOSPITAL ENCOUNTER (OUTPATIENT)
Dept: CARDIOLOGY | Facility: HOSPITAL | Age: 30
Discharge: HOME | End: 2025-01-03
Payer: COMMERCIAL

## 2025-01-03 ENCOUNTER — APPOINTMENT (OUTPATIENT)
Dept: GENERAL RADIOLOGY | Facility: HOSPITAL | Age: 30
End: 2025-01-03
Payer: COMMERCIAL

## 2025-01-03 ENCOUNTER — HOSPITAL ENCOUNTER (EMERGENCY)
Facility: HOSPITAL | Age: 30
Discharge: HOME | End: 2025-01-03
Attending: EMERGENCY MEDICINE
Payer: COMMERCIAL

## 2025-01-03 VITALS
OXYGEN SATURATION: 98 % | RESPIRATION RATE: 18 BRPM | HEIGHT: 68 IN | DIASTOLIC BLOOD PRESSURE: 72 MMHG | BODY MASS INDEX: 41.68 KG/M2 | HEART RATE: 74 BPM | WEIGHT: 275 LBS | SYSTOLIC BLOOD PRESSURE: 126 MMHG | TEMPERATURE: 98.3 F

## 2025-01-03 DIAGNOSIS — R00.2 PALPITATIONS: ICD-10-CM

## 2025-01-03 DIAGNOSIS — R00.2 PALPITATIONS: Primary | ICD-10-CM

## 2025-01-03 LAB
ALBUMIN SERPL-MCNC: 4.6 G/DL (ref 3.5–5.2)
ALBUMIN/GLOB SERPL: 1.6 G/DL
ALP SERPL-CCNC: 82 U/L (ref 39–117)
ALT SERPL W P-5'-P-CCNC: 297 U/L (ref 1–41)
AMPHET+METHAMPHET UR QL: NEGATIVE
AMPHETAMINES UR QL: NEGATIVE
ANION GAP SERPL CALCULATED.3IONS-SCNC: 14.4 MMOL/L (ref 5–15)
AST SERPL-CCNC: 121 U/L (ref 1–40)
BARBITURATES UR QL SCN: NEGATIVE
BASOPHILS # BLD AUTO: 0.07 10*3/MM3 (ref 0–0.2)
BASOPHILS NFR BLD AUTO: 0.7 % (ref 0–1.5)
BENZODIAZ UR QL SCN: NEGATIVE
BILIRUB SERPL-MCNC: 0.5 MG/DL (ref 0–1.2)
BUN SERPL-MCNC: 13 MG/DL (ref 6–20)
BUN/CREAT SERPL: 14.6 (ref 7–25)
BUPRENORPHINE SERPL-MCNC: NEGATIVE NG/ML
CALCIUM SPEC-SCNC: 9.4 MG/DL (ref 8.6–10.5)
CANNABINOIDS SERPL QL: NEGATIVE
CHLORIDE SERPL-SCNC: 101 MMOL/L (ref 98–107)
CO2 SERPL-SCNC: 24.6 MMOL/L (ref 22–29)
COCAINE UR QL: NEGATIVE
CREAT SERPL-MCNC: 0.89 MG/DL (ref 0.76–1.27)
D DIMER PPP FEU-MCNC: 0.44 MCGFEU/ML (ref 0–0.5)
DEPRECATED RDW RBC AUTO: 38.2 FL (ref 37–54)
EGFRCR SERPLBLD CKD-EPI 2021: 120.5 ML/MIN/1.73
EOSINOPHIL # BLD AUTO: 0.16 10*3/MM3 (ref 0–0.4)
EOSINOPHIL NFR BLD AUTO: 1.5 % (ref 0.3–6.2)
ERYTHROCYTE [DISTWIDTH] IN BLOOD BY AUTOMATED COUNT: 11.9 % (ref 12.3–15.4)
GEN 5 1HR TROPONIN T REFLEX: <6 NG/L
GLOBULIN UR ELPH-MCNC: 2.9 GM/DL
GLUCOSE SERPL-MCNC: 105 MG/DL (ref 65–99)
HCT VFR BLD AUTO: 47.4 % (ref 37.5–51)
HGB BLD-MCNC: 16.9 G/DL (ref 13–17.7)
IMM GRANULOCYTES # BLD AUTO: 0.04 10*3/MM3 (ref 0–0.05)
IMM GRANULOCYTES NFR BLD AUTO: 0.4 % (ref 0–0.5)
LYMPHOCYTES # BLD AUTO: 4.47 10*3/MM3 (ref 0.7–3.1)
LYMPHOCYTES NFR BLD AUTO: 42.5 % (ref 19.6–45.3)
MCH RBC QN AUTO: 31.9 PG (ref 26.6–33)
MCHC RBC AUTO-ENTMCNC: 35.7 G/DL (ref 31.5–35.7)
MCV RBC AUTO: 89.6 FL (ref 79–97)
METHADONE UR QL SCN: NEGATIVE
MONOCYTES # BLD AUTO: 0.89 10*3/MM3 (ref 0.1–0.9)
MONOCYTES NFR BLD AUTO: 8.5 % (ref 5–12)
NEUTROPHILS NFR BLD AUTO: 4.89 10*3/MM3 (ref 1.7–7)
NEUTROPHILS NFR BLD AUTO: 46.4 % (ref 42.7–76)
NRBC BLD AUTO-RTO: 0 /100 WBC (ref 0–0.2)
NT-PROBNP SERPL-MCNC: <36 PG/ML (ref 0–450)
OPIATES UR QL: NEGATIVE
OXYCODONE UR QL SCN: NEGATIVE
PCP UR QL SCN: NEGATIVE
PLATELET # BLD AUTO: 227 10*3/MM3 (ref 140–450)
PMV BLD AUTO: 10.1 FL (ref 6–12)
POTASSIUM SERPL-SCNC: 3.7 MMOL/L (ref 3.5–5.2)
PROT SERPL-MCNC: 7.5 G/DL (ref 6–8.5)
QT INTERVAL: 297 MS
QTC INTERVAL: 415 MS
RBC # BLD AUTO: 5.29 10*6/MM3 (ref 4.14–5.8)
SODIUM SERPL-SCNC: 140 MMOL/L (ref 136–145)
TRICYCLICS UR QL SCN: NEGATIVE
TROPONIN T NUMERIC DELTA: NORMAL
TROPONIN T SERPL HS-MCNC: <6 NG/L
TSH SERPL DL<=0.05 MIU/L-ACNC: 1.15 UIU/ML (ref 0.27–4.2)
WBC NRBC COR # BLD AUTO: 10.52 10*3/MM3 (ref 3.4–10.8)

## 2025-01-03 PROCEDURE — 93225 XTRNL ECG REC<48 HRS REC: CPT

## 2025-01-03 PROCEDURE — 71045 X-RAY EXAM CHEST 1 VIEW: CPT

## 2025-01-03 PROCEDURE — 85379 FIBRIN DEGRADATION QUANT: CPT | Performed by: EMERGENCY MEDICINE

## 2025-01-03 PROCEDURE — 93226 XTRNL ECG REC<48 HR SCAN A/R: CPT

## 2025-01-03 PROCEDURE — 84443 ASSAY THYROID STIM HORMONE: CPT | Performed by: EMERGENCY MEDICINE

## 2025-01-03 PROCEDURE — 83880 ASSAY OF NATRIURETIC PEPTIDE: CPT | Performed by: EMERGENCY MEDICINE

## 2025-01-03 PROCEDURE — 36415 COLL VENOUS BLD VENIPUNCTURE: CPT

## 2025-01-03 PROCEDURE — 93005 ELECTROCARDIOGRAM TRACING: CPT | Performed by: EMERGENCY MEDICINE

## 2025-01-03 PROCEDURE — 84484 ASSAY OF TROPONIN QUANT: CPT | Performed by: EMERGENCY MEDICINE

## 2025-01-03 PROCEDURE — 80306 DRUG TEST PRSMV INSTRMNT: CPT | Performed by: EMERGENCY MEDICINE

## 2025-01-03 PROCEDURE — 99284 EMERGENCY DEPT VISIT MOD MDM: CPT | Performed by: EMERGENCY MEDICINE

## 2025-01-03 PROCEDURE — 80053 COMPREHEN METABOLIC PANEL: CPT | Performed by: EMERGENCY MEDICINE

## 2025-01-03 PROCEDURE — 85025 COMPLETE CBC W/AUTO DIFF WBC: CPT | Performed by: EMERGENCY MEDICINE

## 2025-01-03 NOTE — ED PROVIDER NOTES
Subjective   History of Present Illness    Chief complaint: Fast heart rate and elevated blood pressure    Location: Home    Quality/Severity: Heart rate of 160    Timing/Onset/Duration: Noted tonight, symptoms intermittently for the last couple of days    Modifying Factors: Patient feels better now    Associated Symptoms: No headache.  No fever chills or cough.  No sore throat earache or nasal congestion.  Patient complains of some chest tightness and mild shortness of breath.  No abdominal pain.  No diarrhea or burning when he urinates.  No nausea or vomiting.  No diaphoresis.    Narrative: This 29-year-old presents with heart rate of 160 at home.  Patient has had chest tightness for last 2 days.  The patient states that he is about ready graduate from [employer] in [place] school and has had a lot of stress from that class.  Patient does not use illicit drugs.  He states he drinks 1 energy drink a day.    PCP: No PCP    Review of Systems   Constitutional:  Negative for chills and fever.   HENT:  Negative for congestion, ear pain and sore throat.    Respiratory:  Positive for chest tightness and shortness of breath. Negative for cough.    Cardiovascular:  Positive for palpitations.   Gastrointestinal:  Negative for abdominal pain, diarrhea, nausea and vomiting.   Genitourinary:  Negative for difficulty urinating.   Musculoskeletal:  Negative for back pain.   Skin:  Negative for rash.   Neurological:  Negative for headaches.       Past Medical History:   Diagnosis Date    Bronchitis, acute     Cough     Fever, unspecified     Fracture, finger, distal phalanx     Gastroenteritis     Kidney stones     Migraines     Seizure disorder     Sinusitis     Testicular pain     URI (upper respiratory infection)     Viral infection        No Known Allergies    Past Surgical History:   Procedure Laterality Date    ANKLE SURGERY      TONSILLECTOMY AND ADENOIDECTOMY         Family History   Adopted: Yes       Social History     Socioeconomic  History    Marital status: Single   Tobacco Use    Smoking status: Every Day     Current packs/day: 1.00     Types: Cigarettes    Smokeless tobacco: Never   Substance and Sexual Activity    Alcohol use: Not Currently    Drug use: Not Currently           Objective   Physical Exam  Vitals (The temperature is 98.3 °F, pulse 96, respirations 18, /103, room air pulse ox 97%.) and nursing note reviewed.   HENT:      Head: Normocephalic and atraumatic.      Right Ear: Tympanic membrane normal.      Left Ear: Tympanic membrane normal.      Nose: Nose normal.      Mouth/Throat:      Mouth: Mucous membranes are moist.      Pharynx: No oropharyngeal exudate or posterior oropharyngeal erythema.   Cardiovascular:      Rate and Rhythm: Normal rate and regular rhythm.      Pulses: Normal pulses.      Heart sounds: No murmur heard.     No friction rub. No gallop.   Pulmonary:      Effort: Pulmonary effort is normal.      Breath sounds: Normal breath sounds.   Abdominal:      General: Abdomen is flat. Bowel sounds are normal. There is no distension.      Palpations: Abdomen is soft. There is no mass.      Tenderness: There is no abdominal tenderness. There is no right CVA tenderness, left CVA tenderness, guarding or rebound.      Hernia: No hernia is present.   Musculoskeletal:         General: No swelling or tenderness. Normal range of motion.      Cervical back: Normal range of motion and neck supple.   Skin:     General: Skin is warm and dry.      Findings: No rash.   Neurological:      General: No focal deficit present.      Mental Status: He is oriented to person, place, and time.         Procedures           ED Course  ED Course as of 01/03/25 0347   Fri Jan 03, 2025 0210 The CBC is unremarkable. [RC]   0250 The proBNP is less than 36 and normal [RC]   0250 The TSH is 1.15 and normal. [RC]   0250 He has since here troponin is less than 6 and normal. [RC]   0250 The glucose is 105, the ALT is 297 and AST is 121, CMP is  otherwise unremarkable. [RC]   0250 Urine drug screen is negative. [RC]   0347 The 1 hour troponin is less than 6 with delta 3 0. [RC]      ED Course User Index  [RC] Kenny Rubi MD      01:42 EST, 01/03/25:  The EKG was obtained at 0 139 and read by me at 139.  EKG shows a sinus tachycardia with rate of 117.  There is a normal axis with no hypertrophy.  The AR is borderline prolonged at 200 ms, the QRS and QT intervals are unremarkable.  There is no ectopy.  There is no acute ST elevation or depression.    03:22 EST, 01/03/25:  The repeat blood pressure is 131/78.  The heart rate is 73.                                 03:47 EST, 01/03/25: The patient was reassessed.  He has no complaints.  His vital signs were reviewed and are stable.  He has a normal heart rate.  Showing sinus rhythm on the monitor.    03:52 EST, 01/03/25: The patient's diagnosis of palpitations was discussed with him.  Patient will be fitted for Holter monitor.  These results will be sent to local cardiology.  She should call the patient connection line in the morning for follow-up appointment with local cardiology within 1 week.  Patient should decrease his caffeine intake.  He should return to the emergency department if he has further episodes of fast heart rate, chest pain, shortness of breath, worse in any way at all.  All the patient questions were answered he will be discharged in good condition.                  Medical Decision Making      Final diagnoses:   None       ED Disposition  ED Disposition       None            No follow-up provider specified.       Medication List      No changes were made to your prescriptions during this visit.       No orders to display     Labs Reviewed - No data to display  No results found.    Final diagnoses:   Palpitations         ED Medications:  Medications - No data to display    New Medications:     Medication List        ASK your doctor about these medications      ibuprofen 200 MG  tablet  Commonly known as: ADVIL,MOTRIN     ondansetron ODT 4 MG disintegrating tablet  Commonly known as: ZOFRAN-ODT  Place 1 tablet on the tongue Every 6 (Six) Hours As Needed for Nausea or Vomiting for up to 25 doses.     oxyCODONE-acetaminophen 5-325 MG per tablet  Commonly known as: PERCOCET  Take 1 tablet by mouth Every 6 (Six) Hours As Needed for Moderate Pain or Severe Pain for up to 25 doses. 2 tabs if needed              Stopped Medications:     Medication List        ASK your doctor about these medications      ibuprofen 200 MG tablet  Commonly known as: ADVIL,MOTRIN     ondansetron ODT 4 MG disintegrating tablet  Commonly known as: ZOFRAN-ODT  Place 1 tablet on the tongue Every 6 (Six) Hours As Needed for Nausea or Vomiting for up to 25 doses.     oxyCODONE-acetaminophen 5-325 MG per tablet  Commonly known as: PERCOCET  Take 1 tablet by mouth Every 6 (Six) Hours As Needed for Moderate Pain or Severe Pain for up to 25 doses. 2 tabs if needed                   Kenny Rubi MD  01/03/25 0454

## 2025-01-03 NOTE — DISCHARGE INSTRUCTIONS
Decrease your caffeine intake.  Decrease your stress.  Call the patient connection line today for an appointment with local cardiology within 1 week.  Return to the Emergency Department if there are further episodes of fast heart rate, chest pain, shortness of breath, worse anyway at all.

## 2025-01-06 LAB
CV ZIO BASELINE AVG BPM: 80
CV ZIO BASELINE BPM HIGH: 135
CV ZIO BASELINE BPM LOW: 40